# Patient Record
Sex: FEMALE | Race: WHITE | NOT HISPANIC OR LATINO | URBAN - METROPOLITAN AREA
[De-identification: names, ages, dates, MRNs, and addresses within clinical notes are randomized per-mention and may not be internally consistent; named-entity substitution may affect disease eponyms.]

---

## 2018-07-24 ENCOUNTER — OFFICE VISIT - HEALTHEAST (OUTPATIENT)
Dept: FAMILY MEDICINE | Facility: CLINIC | Age: 48
End: 2018-07-24

## 2018-07-24 DIAGNOSIS — R68.89 FLU-LIKE SYMPTOMS: ICD-10-CM

## 2018-07-24 DIAGNOSIS — Z91.89 RISK OF EXPOSURE TO LYME DISEASE: ICD-10-CM

## 2018-07-24 RX ORDER — VENLAFAXINE 75 MG/1
75 TABLET ORAL DAILY
Status: SHIPPED | COMMUNITY
Start: 2018-07-24

## 2018-07-25 ENCOUNTER — COMMUNICATION - HEALTHEAST (OUTPATIENT)
Dept: SCHEDULING | Facility: CLINIC | Age: 48
End: 2018-07-25

## 2018-07-25 LAB — B BURGDOR IGG+IGM SER QL: 0.05 INDEX VALUE

## 2021-05-25 ENCOUNTER — RECORDS - HEALTHEAST (OUTPATIENT)
Dept: ADMINISTRATIVE | Facility: CLINIC | Age: 51
End: 2021-05-25

## 2021-06-01 VITALS — WEIGHT: 136.8 LBS

## 2021-06-26 NOTE — PROGRESS NOTES
"Progress Notes by Darwin Hairston PA-C at 7/24/2018 11:10 AM     Author: Darwin Hairston PA-C Service: -- Author Type: Physician Assistant    Filed: 7/24/2018 12:34 PM Encounter Date: 7/24/2018 Status: Signed    : Darwin Hairston PA-C (Physician Assistant)       Subjective:      Patient ID: Jh Ball is a 47 y.o. female.    Chief Complaint:    HPI  Jh Ball is a 47 y.o. female who presents today complaining of concern for flulike symptoms for the last 3 weeks that are intermittent.  Patient states that over 4 July weekend she had a large bug bite on the left lateral thigh.  It did swell up and have subcutaneous ecchymoses.  Then completely resolved.  Subsequent to that insect bite she has had headaches stiff neck arthralgias myalgias and intermittent subjective low-grade fevers.  Currently the patient's temperature is 97.8 in the office.     He has not had any known tick bites she did not remove the tick but she noticed that there was \"an insect bite\".  She has not had any known prior Lyme disease.  She has not had any other skin rash except for as previously reported with the bug bite.    No past medical history on file.    No past surgical history on file.    No family history on file.    Social History   Substance Use Topics   ? Smoking status: Never Smoker   ? Smokeless tobacco: Never Used   ? Alcohol use None       Review of Systems  As above in HPI, otherwise negative.    Objective:     /65 (Patient Site: Right Arm, Patient Position: Sitting, Cuff Size: Adult Regular)  Pulse 60  Temp 97.8  F (36.6  C) (Oral)   Resp 20  Wt 136 lb 12.8 oz (62.1 kg)  LMP  (LMP Unknown)  SpO2 98%  Breastfeeding? No    Physical Exam  General: Patient is resting comfortably no acute distress is afebrile  HEENT: Head is normocephalic atraumatic   No cervical lymphadenopathy present  Lungs: Clear to auscultation bilaterally  Heart: Regular rate and rhythm  Skin: Without rash non-diaphoretic    Lab:  Screening Lyme " disease is pending at time of documentation.    Assessment:     Procedures    The primary encounter diagnosis was Risk of exposure to Lyme disease. A diagnosis of Flu-like symptoms was also pertinent to this visit.    Plan:     1. Risk of exposure to Lyme disease  venlafaxine (EFFEXOR) 75 MG tablet    doxycycline (MONODOX) 100 MG capsule    Lyme Antibody [place]   2. Flu-like symptoms  venlafaxine (EFFEXOR) 75 MG tablet    doxycycline (MONODOX) 100 MG capsule    Lyme Antibody [place]       Had a conversation with the patient regarding the risks of Lyme disease exposure and the treatments that we will go through.  Advised her that we will treat her for her symptoms since she has flulike symptoms and do a screening Lyme test.  She will follow-up with her primary care provider to reassess her symptoms, improvement with her treatment, and if she should continue with the course of treatment based on lab results.  Questions were answered to patient's satisfaction before discharge    Patient Instructions     Take antibiotic as directed.  Be careful in the sinus this antibiotic may have photo sensitivity and use widebrimmed hats stay in the shade and sunscreen for skin protection  Follow-up with primary care provider in 3-4 days for reevaluation of your symptoms and the Lyme disease testing results.  At that office visit, he can decide if he will continue with the medication and treatment, continue with a longer course of 3 weeks of treatment based on symptoms, or review symptoms and look for different diagnoses.    As a result of our visit today, here are the action plans for you:    1. Medication(s) to stop: There are no discontinued medications.    2. Medication(s) to start or change:   Medications Ordered   Medications   ? doxycycline (MONODOX) 100 MG capsule     Sig: Take 1 capsule (100 mg total) by mouth 2 (two) times a day for 14 days.     Dispense:  20 capsule     Refill:  0       3. Other instructions: Yes  "        Tick Bites  Ticks are small arachnids that feed on the blood of rodents, rabbits, birds, deer, dogs, and people. A tick bite may cause a reaction like a spider bite. You may have redness, itching, and slight swelling at the site. Sometimes you may have no reaction where the tick bit you.  Ticks may gorge themselves for days before you find and remove them. The bites themselves aren't cause for concern. But ticks can carry and pass on illnesses such as Lyme disease and [place] spotted fever. Both diseases begin with a rash and symptoms similar to the flu. In advanced stages, these diseases can be quite serious.     A \"bull's eye\" rash is a common symptom of Lyme disease.   When to go to the emergency room (ER)  Not all ticks carry disease. And a tick must stay attached for at least 24 hours to infect you. If you find a tick, don't panic. Try to carefully remove it with tweezers. Grasp the tick near its head and pull without twisting. If you can't easily dislodge the tick or if you leave the head in your skin, get medical care right away.  What to expect in the ER    The tick or any parts of the tick will be removed and the bite will be cleaned.    To prevent disease, you may be given antibiotics. Both Lyme disease and [place] spotted fever respond quickly to these medicines.    You may be asked to see your healthcare provider for a blood test to check for Lyme disease.  Follow-up care  Some states and [facility] have services that test ticks for Lyme disease and other diseases. Check with your local officials to see if this service is available in your area.  If you remove a tick yourself, watch for signs of a tick-borne illness. Symptoms may show up within a few days or weeks after a bite. Call your healthcare provider if you notice any of the following:    Rash. The rash may spread outward in a ring from a hard white lump. Or it may move up your arms and legs to your chest.    Chills and " fever    Body aches and joint pain    Severe headache  Date Last Reviewed: 12/1/2016 2000-2017 [employer]. [address] [place] [address], [place] [address]. All rights reserved. This information is not intended as a substitute for professional medical care. Always follow your healthcare professional's instructions.        Tick Bite, Antibiotic Treatment    Ticks are small arachnids that feed on the blood of rodents, rabbits, birds, deer, dogs and humans. The bite may cause a local reaction like that of a spider, with a small amount of local redness, itching and slight swelling. Sometimes there is no local reaction.  Most tick bites are harmless. But some ticks carry diseases, such as Lyme disease or [place] spotted fever. These can be passed to people at the time of the bite. Lyme disease is of greatest concern. Right now you have no symptoms of Lyme disease or other serious reaction to the bite. It is important to watch for the warning signs, which could appear weeks to months after the tick bite.  Home care  The following guidelines can help you care for your bite at home:    If itching is a problem, avoid tight clothing and anything that heats up your skin. This includes hot showers or baths and direct sunlight. This often makes the itching worse.    An ice pack will reduce local areas of redness and itching. Make your own ice pack by putting ice cubes in a zip-top plastic bag and wrapping it in a thin towel. Over-the-counter creams containing benzocaine may help with itching.    You can use an antihistamine with diphenhydramine if your doctor did not give you another antihistamine. This medicine may be used to reduce itching if large areas of the skin are involved. It is available at drugstores and grocery stores. If symptoms continue, talk with your doctor or pharmacist about other over-the counter medicines that may be helpful.    Your doctor may prescribe antibiotics to reduce your risk of  getting Lyme disease. It is very important that you take them exactly as directed until they are completely finished.  Follow-up care  Follow up with your healthcare provider, or as advised.  Call 911  Call 911 if any of these occur:    Irregular or rapid heartbeat    Numbness, tingling, or weakness in the arms or legs  When to seek medical advice  Call your healthcare provider right away if any of these occur:  Signs of local infection. Watch for these during the next few days:    Increasing redness around the bite site    Increased pain or swelling    Fever over 100.4 F (38 C), or as directed by your healthcare provider    Fluid draining from the bite area  Signs of tick-related disease. Watch for these over the next few weeks to months:    Circular, red, ring-like rash appears at the bite area within 1 to 3 weeks    Tiredness, fever or chills, nausea or vomiting    Neck pain or stiffness, headache, or confusion    Muscle or bone aches    Joint pain or swelling, especially in the knee    Weakness on one side of the face  Date Last Reviewed: 10/1/2016    4873-3688 [employer]. [address] [place] [address], [place] [address]. All rights reserved. This information is not intended as a substitute for professional medical care. Always follow your healthcare professional's instructions.

## 2021-07-03 NOTE — ADDENDUM NOTE
Addendum Note by Shaun Weaver PA-C at 7/24/2018 12:52 PM     Author: Shaun Weaver PA-C Service: -- Author Type: Physician Assistant    Filed: 7/24/2018 12:52 PM Encounter Date: 7/24/2018 Status: Signed    : Shaun Weaver PA-C (Physician Assistant)    Addended by: SHAUN WEAVER on: 7/24/2018 12:52 PM        Modules accepted: Orders